# Patient Record
(demographics unavailable — no encounter records)

---

## 2024-11-26 NOTE — PHYSICAL EXAM
[Well Developed] : well developed [Well Nourished] : well nourished [No Acute Distress] : no acute distress [Normal Conjunctiva] : normal conjunctiva [Normal Venous Pressure] : normal venous pressure [No Carotid Bruit] : no carotid bruit [Normal S1, S2] : normal S1, S2 [No Murmur] : no murmur [No Rub] : no rub [No Gallop] : no gallop [Clear Lung Fields] : clear lung fields [Good Air Entry] : good air entry [No Respiratory Distress] : no respiratory distress  [Soft] : abdomen soft [Non Tender] : non-tender [No Masses/organomegaly] : no masses/organomegaly [Normal Bowel Sounds] : normal bowel sounds [Normal Gait] : normal gait [No Edema] : no edema [No Cyanosis] : no cyanosis [No Clubbing] : no clubbing [No Varicosities] : no varicosities [No Rash] : no rash [No Skin Lesions] : no skin lesions [Moves all extremities] : moves all extremities [No Focal Deficits] : no focal deficits [Normal Speech] : normal speech [Alert and Oriented] : alert and oriented [Normal memory] : normal memory [de-identified] : JVP <6 cm w/o HJR

## 2024-11-26 NOTE — HISTORY OF PRESENT ILLNESS
[FreeTextEntry1] : Mr. 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   For full details, please refer to note from 7/28/20.  He was last seen in our office on 2/9/24 feeling well, but still hypertensive. Hydralazine was increased to 25 mg po TID.  Has been well but has been experiencing some fatigue since the summer throughout the day. Denies snoring.   Since his visit, hasn't had any ER visits or hospitalizations. His BPs had been 130s/80s but has been lower to 100-110.  Weight has remained 185-188.  He continues to deny any syncope, LH/dizziness, chest pain, palpitations, dyspnea, PND, orthopnea, nausea/vomiting, abdominal pain, LE edema.

## 2024-11-26 NOTE — PHYSICAL EXAM
[Well Developed] : well developed [Well Nourished] : well nourished [No Acute Distress] : no acute distress [Normal Conjunctiva] : normal conjunctiva [Normal Venous Pressure] : normal venous pressure [No Carotid Bruit] : no carotid bruit [Normal S1, S2] : normal S1, S2 [No Murmur] : no murmur [No Rub] : no rub [No Gallop] : no gallop [Clear Lung Fields] : clear lung fields [Good Air Entry] : good air entry [No Respiratory Distress] : no respiratory distress  [Soft] : abdomen soft [Non Tender] : non-tender [No Masses/organomegaly] : no masses/organomegaly [Normal Bowel Sounds] : normal bowel sounds [Normal Gait] : normal gait [No Edema] : no edema [No Cyanosis] : no cyanosis [No Clubbing] : no clubbing [No Varicosities] : no varicosities [No Rash] : no rash [No Skin Lesions] : no skin lesions [Moves all extremities] : moves all extremities [No Focal Deficits] : no focal deficits [Normal Speech] : normal speech [Alert and Oriented] : alert and oriented [Normal memory] : normal memory [de-identified] : JVP <6 cm w/o HJR

## 2024-11-26 NOTE — CARDIOLOGY SUMMARY
[de-identified] : 11/26/24 - unchanged  11/7/23 - NSR, R axis deviation, PRWP 5/9/23 - unchanged 5/24/22 - NSR, R axis deviation, PRWP 11/16/21 - NSR, R axis deviation, no Q waves 5/11/21 - NSR, HR 81, R axis deviation, PRWP 2/9/21 - NSR, HR 95, PRWP, right axis deviation  12/22/20 - NSR, HR 80, PRWP, right axis deviation  10/21/20- Sinus rhythm, HR 87, left atrial enlargement, R axis deviation 10/6/20 - sinus tach, , biatrial enlargement, PRWP, APC, R axis deviation 9/10/20 - sinus tach, , PRWP 7/28/20 - sinus tachycardia, , TEJAL, LAE, PRWP [de-identified] : 12/7/21 - treadmill perfusion test; 7 METS, terminated d/t dyspnea; normal SPECT Myocardial Perfusion Imaging. No evidence of reversible or fixed perfusion defects. Normal left ventricular contractility with an ejection fraction of 61 (Normal: 50% or greater). No regional wall motion abnormalities.   [de-identified] : 10/25/24 TTE: EF 59%, LVIDd 5.0 mild MR; nl RV size/function  5/10/24 TTE:  EF 53% LVIDd 5.4 mild MR  12/16/22 - TTE - /109; EF 55-60%, LVEDD 4.7 cm, mild MR, trace TR,  12/14/21 - EF 55-60%, LVEDD 4.2 cm, nl RV size/function  3/19/21 - EF 50-55%, LVEDD 4.2 cm, nl RV size/function 12/22/20 - LVEDD 4.5 cm, EF 40-45%, nl RV size/function, IVC collapsible, VTI 14 cm (much improved from prior); off milrinone; no RA thrombus 9/30/20 - LVEDD 5.7 cm, EF 15-20%, mild LA dilatation, normal RV size/function, IVC appears normal, trace MR, trace TR; no RA thrombus noted 7/16/20 - RA mass smaller 1.1 x 1.5 cm (pendunculated w/ independent motion) 6/30/20 - LVEDD 5.6 cm, EF 20-25%, homogenous echodensity 2.34x1.9 cm in RA, small echodensity in RV apex 1.1 x0.9 cm  [de-identified] : \par  7/13/20 - 1x1.5 cm RA mass possibly c/w thrombus (smaller than on prior TTE); EF 14%; LGE uninterpretable d/t motion [de-identified] : \par  7/9/20 - LM normal, mLAD 70% stenosis, D1 70% stenosis, distal LCx 90% stenosis, mRCA 90% stenosis \par  no RHC available d/t RA mass \par   [de-identified] : \par  3/1/22 - PFTs - FEV1 3.43 (89%), FVC 4.28 (87%), FEV1/FVC 80%

## 2024-11-26 NOTE — ASSESSMENT
[FreeTextEntry1] : Mr. 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 Has NYHA class I-II symptoms and appears well compensated with fatigue of unclear etiology.   1. HFrecEF - likely mixed as out of proportion to CAD - continue hydralazine to 25 mg po TID  - on isordil 10 mg TID but will discontinue - continue coreg 25 mg twice/day; if has persistent fatigue, can consider switching to toprol at night - on losartan 50 mg BID (did not tolerate Entresto retrial) - off dino with improvement in renal function and appears euvolemic - on Farxiga 5 mg daily  - lasix prn which he has not needed - maintain log of weight/BP - given improvement in EF, ICD not required - counseled on dietary changes  2. Tobacco dependence - abstinent since 2020 - commended him on smoking cessation  3. Diabetes - A1C 6.6% - has close f/u with Dr. Chelsy Mccarty - on Ozempic, metformin and Farxiga  4. CAD - stable; no active ischemia; medically managed; no angina although dyspnea may be anginal equivalent - continue Crestor 40mg po QHS; needs lipid panel checked at next visit - not on ASA as on Eliquis - on BB  5. DVT/RA Mass/PE - unclear etiology of hypercoagulable state; resolved on TTE; s/p IVC filter (now removed) - workup in hospital negative - negative mediastinoscopy with LN biopsy - negative for Prothrombin gene mutation and MTHFR gene - c/w Eliquis 2.5 mg PO BID  RTC 6 months with PA then me in 1 year No

## 2024-11-26 NOTE — CARDIOLOGY SUMMARY
[de-identified] : 11/26/24 - unchanged  11/7/23 - NSR, R axis deviation, PRWP 5/9/23 - unchanged 5/24/22 - NSR, R axis deviation, PRWP 11/16/21 - NSR, R axis deviation, no Q waves 5/11/21 - NSR, HR 81, R axis deviation, PRWP 2/9/21 - NSR, HR 95, PRWP, right axis deviation  12/22/20 - NSR, HR 80, PRWP, right axis deviation  10/21/20- Sinus rhythm, HR 87, left atrial enlargement, R axis deviation 10/6/20 - sinus tach, , biatrial enlargement, PRWP, APC, R axis deviation 9/10/20 - sinus tach, , PRWP 7/28/20 - sinus tachycardia, , TEJAL, LAE, PRWP [de-identified] : 12/7/21 - treadmill perfusion test; 7 METS, terminated d/t dyspnea; normal SPECT Myocardial Perfusion Imaging. No evidence of reversible or fixed perfusion defects. Normal left ventricular contractility with an ejection fraction of 61 (Normal: 50% or greater). No regional wall motion abnormalities.   [de-identified] : 10/25/24 TTE: EF 59%, LVIDd 5.0 mild MR; nl RV size/function  5/10/24 TTE:  EF 53% LVIDd 5.4 mild MR  12/16/22 - TTE - /109; EF 55-60%, LVEDD 4.7 cm, mild MR, trace TR,  12/14/21 - EF 55-60%, LVEDD 4.2 cm, nl RV size/function  3/19/21 - EF 50-55%, LVEDD 4.2 cm, nl RV size/function 12/22/20 - LVEDD 4.5 cm, EF 40-45%, nl RV size/function, IVC collapsible, VTI 14 cm (much improved from prior); off milrinone; no RA thrombus 9/30/20 - LVEDD 5.7 cm, EF 15-20%, mild LA dilatation, normal RV size/function, IVC appears normal, trace MR, trace TR; no RA thrombus noted 7/16/20 - RA mass smaller 1.1 x 1.5 cm (pendunculated w/ independent motion) 6/30/20 - LVEDD 5.6 cm, EF 20-25%, homogenous echodensity 2.34x1.9 cm in RA, small echodensity in RV apex 1.1 x0.9 cm  [de-identified] : \par  7/13/20 - 1x1.5 cm RA mass possibly c/w thrombus (smaller than on prior TTE); EF 14%; LGE uninterpretable d/t motion [de-identified] : \par  7/9/20 - LM normal, mLAD 70% stenosis, D1 70% stenosis, distal LCx 90% stenosis, mRCA 90% stenosis \par  no RHC available d/t RA mass \par   [de-identified] : \par  3/1/22 - PFTs - FEV1 3.43 (89%), FVC 4.28 (87%), FEV1/FVC 80%

## 2024-11-26 NOTE — HISTORY OF PRESENT ILLNESS
[FreeTextEntry1] : Mr. 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   For full details, please refer to note from 7/28/20.  He was last seen in our office on 2/9/24 feeling well, but still hypertensive. Hydralazine was increased to 25 mg po TID.  Has been well but has been experiencing some fatigue since the summer throughout the day. Denies snoring.   Since his visit, hasn't had any ER visits or hospitalizations. His BPs had been 130s/80s but has been lower to 100-110.  Weight has remained 185-188.  He continues to deny any syncope, LH/dizziness, chest pain, palpitations, dyspnea, PND, orthopnea, nausea/vomiting, abdominal pain, LE edema.

## 2024-11-26 NOTE — ASSESSMENT
[FreeTextEntry1] : Mr. 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 Has NYHA class I-II symptoms and appears well compensated with fatigue of unclear etiology.   1. HFrecEF - likely mixed as out of proportion to CAD - continue hydralazine to 25 mg po TID  - on isordil 10 mg TID but will discontinue - continue coreg 25 mg twice/day; if has persistent fatigue, can consider switching to toprol at night - on losartan 50 mg BID (did not tolerate Entresto retrial) - off dino with improvement in renal function and appears euvolemic - on Farxiga 5 mg daily  - lasix prn which he has not needed - maintain log of weight/BP - given improvement in EF, ICD not required - counseled on dietary changes  2. Tobacco dependence - abstinent since 2020 - commended him on smoking cessation  3. Diabetes - A1C 6.6% - has close f/u with Dr. Chelsy Mccarty - on Ozempic, metformin and Farxiga  4. CAD - stable; no active ischemia; medically managed; no angina although dyspnea may be anginal equivalent - continue Crestor 40mg po QHS; needs lipid panel checked at next visit - not on ASA as on Eliquis - on BB  5. DVT/RA Mass/PE - unclear etiology of hypercoagulable state; resolved on TTE; s/p IVC filter (now removed) - workup in hospital negative - negative mediastinoscopy with LN biopsy - negative for Prothrombin gene mutation and MTHFR gene - c/w Eliquis 2.5 mg PO BID  RTC 6 months with PA then me in 1 year

## 2025-05-30 NOTE — PHYSICAL EXAM
[Well Developed] : well developed [Well Nourished] : well nourished [No Acute Distress] : no acute distress [Normal Conjunctiva] : normal conjunctiva [Normal Venous Pressure] : normal venous pressure [No Carotid Bruit] : no carotid bruit [Normal S1, S2] : normal S1, S2 [No Murmur] : no murmur [No Rub] : no rub [No Gallop] : no gallop [Clear Lung Fields] : clear lung fields [Good Air Entry] : good air entry [No Respiratory Distress] : no respiratory distress  [Soft] : abdomen soft [Non Tender] : non-tender [No Masses/organomegaly] : no masses/organomegaly [Normal Bowel Sounds] : normal bowel sounds [Normal Gait] : normal gait [No Edema] : no edema [No Cyanosis] : no cyanosis [No Clubbing] : no clubbing [No Varicosities] : no varicosities [No Rash] : no rash [No Skin Lesions] : no skin lesions [Moves all extremities] : moves all extremities [No Focal Deficits] : no focal deficits [Normal Speech] : normal speech [Alert and Oriented] : alert and oriented [Normal memory] : normal memory [de-identified] : JVP <6 cm w/o HJR

## 2025-05-30 NOTE — ASSESSMENT
[FreeTextEntry1] : Mr. 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 Has NYHA class I-II symptoms and appears well compensated with fatigue of unclear etiology.   1. HFrecEF - likely mixed as out of proportion to CAD - continue hydralazine to 25 mg po TID  - Stop coreg and start toprol 100 mg po QHS.   Will lamont 2 weeks and increase if needed  - on losartan 50 mg BID (did not tolerate Entresto retrial) - off dino with improvement in renal function and appears euvolemic - on Farxiga 5 mg daily  - lasix prn which he has not needed - maintain log of weight/BP - given improvement in EF, ICD not required - counseled on dietary changes  2. Tobacco dependence - abstinent since 2020 - commended him on smoking cessation  3. Diabetes - A1C 6.6% - has close f/u with Dr. Chelsy Mccarty - on Ozempic, metformin and Farxiga  4. CAD - stable; no active ischemia; medically managed; no angina although dyspnea may be anginal equivalent - continue Crestor 40mg po QHS; needs lipid panel checked at next visit - not on ASA as on Eliquis - on BB  5. DVT/RA Mass/PE - unclear etiology of hypercoagulable state; resolved on TTE; s/p IVC filter (now removed) - workup in hospital negative - negative mediastinoscopy with LN biopsy - negative for Prothrombin gene mutation and MTHFR gene - c/w Eliquis 2.5 mg PO BID  RTC 2 months with PA

## 2025-05-30 NOTE — HISTORY OF PRESENT ILLNESS
[FreeTextEntry1] : Mr. 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   For full details, please refer to note from 7/28/20.  He was last seen in our office on 11/26/24 feeling well, but still hypertensive. Hydralazine was increased to 25 mg po TID.  Has been well but has been experiencing some fatigue since the summer throughout the day. Denies snoring.   Continued fatigued  Since his visit, hasn't had any ER visits or hospitalizations. His BPs had been 150s/90s - 110/80s.  Weight has remained 185-188.  He continues to deny any syncope, LH/dizziness, chest pain, palpitations, dyspnea, PND, orthopnea, nausea/vomiting, abdominal pain, LE edema.

## 2025-05-30 NOTE — CARDIOLOGY SUMMARY
[de-identified] : 11/26/24 - unchanged  11/7/23 - NSR, R axis deviation, PRWP 5/9/23 - unchanged 5/24/22 - NSR, R axis deviation, PRWP 11/16/21 - NSR, R axis deviation, no Q waves 5/11/21 - NSR, HR 81, R axis deviation, PRWP 2/9/21 - NSR, HR 95, PRWP, right axis deviation  12/22/20 - NSR, HR 80, PRWP, right axis deviation  10/21/20- Sinus rhythm, HR 87, left atrial enlargement, R axis deviation 10/6/20 - sinus tach, , biatrial enlargement, PRWP, APC, R axis deviation 9/10/20 - sinus tach, , PRWP 7/28/20 - sinus tachycardia, , TEJAL, LAE, PRWP [de-identified] : 12/7/21 - treadmill perfusion test; 7 METS, terminated d/t dyspnea; normal SPECT Myocardial Perfusion Imaging. No evidence of reversible or fixed perfusion defects. Normal left ventricular contractility with an ejection fraction of 61 (Normal: 50% or greater). No regional wall motion abnormalities.   [de-identified] : 10/25/24 TTE: EF 59%, LVIDd 5.0 mild MR; nl RV size/function  5/10/24 TTE:  EF 53% LVIDd 5.4 mild MR  12/16/22 - TTE - /109; EF 55-60%, LVEDD 4.7 cm, mild MR, trace TR,  12/14/21 - EF 55-60%, LVEDD 4.2 cm, nl RV size/function  3/19/21 - EF 50-55%, LVEDD 4.2 cm, nl RV size/function 12/22/20 - LVEDD 4.5 cm, EF 40-45%, nl RV size/function, IVC collapsible, VTI 14 cm (much improved from prior); off milrinone; no RA thrombus 9/30/20 - LVEDD 5.7 cm, EF 15-20%, mild LA dilatation, normal RV size/function, IVC appears normal, trace MR, trace TR; no RA thrombus noted 7/16/20 - RA mass smaller 1.1 x 1.5 cm (pendunculated w/ independent motion) 6/30/20 - LVEDD 5.6 cm, EF 20-25%, homogenous echodensity 2.34x1.9 cm in RA, small echodensity in RV apex 1.1 x0.9 cm  [de-identified] : \par  7/13/20 - 1x1.5 cm RA mass possibly c/w thrombus (smaller than on prior TTE); EF 14%; LGE uninterpretable d/t motion [de-identified] : \par  7/9/20 - LM normal, mLAD 70% stenosis, D1 70% stenosis, distal LCx 90% stenosis, mRCA 90% stenosis \par  no RHC available d/t RA mass \par   [de-identified] : \par  3/1/22 - PFTs - FEV1 3.43 (89%), FVC 4.28 (87%), FEV1/FVC 80%